# Patient Record
Sex: FEMALE | Race: BLACK OR AFRICAN AMERICAN | NOT HISPANIC OR LATINO | Employment: FULL TIME | URBAN - METROPOLITAN AREA
[De-identification: names, ages, dates, MRNs, and addresses within clinical notes are randomized per-mention and may not be internally consistent; named-entity substitution may affect disease eponyms.]

---

## 2017-02-06 ENCOUNTER — TELEPHONE (OUTPATIENT)
Dept: SPINE | Facility: CLINIC | Age: 22
End: 2017-02-06

## 2017-02-06 DIAGNOSIS — M54.50 LOW BACK PAIN WITHOUT SCIATICA, UNSPECIFIED BACK PAIN LATERALITY, UNSPECIFIED CHRONICITY: Primary | ICD-10-CM

## 2017-02-13 ENCOUNTER — HOSPITAL ENCOUNTER (OUTPATIENT)
Dept: RADIOLOGY | Facility: OTHER | Age: 22
Discharge: HOME | End: 2017-02-13
Attending: NEUROLOGICAL SURGERY
Payer: COMMERCIAL

## 2017-02-13 ENCOUNTER — OFFICE VISIT (OUTPATIENT)
Dept: SPINE | Facility: CLINIC | Age: 22
End: 2017-02-13
Attending: ORTHOPAEDIC SURGERY
Payer: COMMERCIAL

## 2017-02-13 VITALS — HEART RATE: 54 BPM | DIASTOLIC BLOOD PRESSURE: 68 MMHG | HEIGHT: 63 IN | SYSTOLIC BLOOD PRESSURE: 120 MMHG

## 2017-02-13 DIAGNOSIS — M54.16 LUMBAR RADICULOPATHY: Primary | ICD-10-CM

## 2017-02-13 DIAGNOSIS — M54.50 LOW BACK PAIN WITHOUT SCIATICA, UNSPECIFIED BACK PAIN LATERALITY, UNSPECIFIED CHRONICITY: ICD-10-CM

## 2017-02-13 PROCEDURE — 72100 X-RAY EXAM L-S SPINE 2/3 VWS: CPT | Mod: 26,,, | Performed by: RADIOLOGY

## 2017-02-13 PROCEDURE — 72120 X-RAY BEND ONLY L-S SPINE: CPT | Mod: TC

## 2017-02-13 PROCEDURE — 99999 PR PBB SHADOW E&M-EST. PATIENT-LVL III: CPT | Mod: PBBFAC,,, | Performed by: ORTHOPAEDIC SURGERY

## 2017-02-13 PROCEDURE — 72120 X-RAY BEND ONLY L-S SPINE: CPT | Mod: 26,,, | Performed by: RADIOLOGY

## 2017-02-13 PROCEDURE — 99214 OFFICE O/P EST MOD 30 MIN: CPT | Mod: S$GLB,,, | Performed by: ORTHOPAEDIC SURGERY

## 2017-02-13 RX ORDER — METHYLPREDNISOLONE 4 MG/1
TABLET ORAL
Qty: 1 PACKAGE | Refills: 0 | Status: SHIPPED | OUTPATIENT
Start: 2017-02-13 | End: 2017-03-27 | Stop reason: ALTCHOICE

## 2017-02-13 NOTE — LETTER
February 13, 2017      Hayley Mcarthur MD  [address]  [facility] [address]           [facility]  [address] [place] [address]  Phone: [phone]  Fax: [phone]          Patient: Rosa Marshall   MR Number: [id number]   YOB: 1995   Date of Visit: 2/13/2017       Dear Dr. Hayley Mcarthur:    Thank you for referring Rosa Marshall to me for evaluation. Attached you will find relevant portions of my assessment and plan of care.    If you have questions, please do not hesitate to call me. I look forward to following Rosa Marshall along with you.    Sincerely,    Faizan Silva MD    Enclosure  CC:  No Recipients    If you would like to receive this communication electronically, please contact [email or web address] or [phone] to request more information on [employer] Link access.    For providers and/or their staff who would like to refer a patient to [facility], please contact us through our one-stop-shop provider referral line, [facility], at [phone].    If you feel you have received this communication in error or would no longer like to receive these types of communications, please e-mail [email or web address]

## 2017-02-13 NOTE — PROGRESS NOTES
DATE: 2/13/2017  PATIENT: Rosa Marshall    Attending Physician: Faizan Silva M.D.    CHIEF COMPLAINT: back pain and left leg pain    HISTORY:  Rosa Marshall is a 21 y.o. female, Julio at [employer] - , here for initial evaluation of low back and left leg pain (Back - 8, Leg - 10). The pain has been present for 2 weeks after a fall directly onto her back/buttock after a jump- ~5'. The patient describes the pain as achy back pain radiating down back of left lower extremity to foot.  The pain is worse with running, jumping and improved by nothing. There is associated numbness and tingling. There is no subjective weakness. Prior treatments have included NSAIDs, PT, but no injections or surgery.    The Patient denies myelopathic symptoms such as handwriting changes or difficulty with buttons/coins/keys. Denies perineal paresthesias, bowel/bladder dysfunction.    She has history of left tibial nail in march 2015 for stress fracture.  Also history of right tibia osteoid osteoma diagnosis    PAST MEDICAL/SURGICAL HISTORY:  No past medical history on file.  Past Surgical History   Procedure Laterality Date    Tibia fracture surgery       bilateral    Tibia fracture surgery Left 3/31/2015      Dr. Mcarthur    Tumor removal       In shin bone       Current Medications:   Current Outpatient Prescriptions:     meloxicam (MOBIC) 15 MG tablet, Take 1 tablet (15 mg total) by mouth once daily. Take prilosec daily with medication, Disp: 30 tablet, Rfl: 1    Social History:   Social History     Social History    Marital status: Single     Spouse name: N/A    Number of children: N/A    Years of education: N/A     Occupational History    Not on file.     Social History Main Topics    Smoking status: Never Smoker    Smokeless tobacco: Not on file    Alcohol use No    Drug use: No    Sexual activity: Not on file     Other Topics Concern    Not on file     Social History Narrative       REVIEW OF  "SYSTEMS:  Constitution: Negative. Negative for chills, fever and night sweats.   Cardiovascular: Negative for chest pain and syncope.   Respiratory: Negative for cough and shortness of breath.   Gastrointestinal: See HPI. Negative for nausea/vomiting. Negative for abdominal pain.  Genitourinary: See HPI. Negative for discoloration or dysuria.  Hematologic/Lymphatic: neg for bleeding/clotting disorders.   Musculoskeletal: Negative for falls and muscle weakness.   Neurological: See HPI. neg history of seizures. neg history of cranial surgery or shunts.  Neurological: See HPI. No seizures.   Endocrine: Negative for polydipsia, polyphagia and polyuria.   Allergic/Immunologic: Negative for hives and persistent infections.     EXAM:  Visit Vitals    /68    Pulse (!) 54    Ht 5' 3" (1.6 m)       PHYSICAL EXAMINATION:    General: The patient is a pleasant 21 y.o. female in no apparent distress, the patient is orientatied to person, place and time.  Psych: Normal mood and affect  HEENT: Vision grossly intact, hearing intact to the spoken word.  Lungs: Respirations unlabored.  Gait: Normal station and gait, no difficulty with toe or heel walk.   Skin: Dorsal lumbar skin negative for rashes, lesions, hairy patches and surgical scars. There is + left-sided lumbar tenderness to palpation.  Range of motion: Lumbar range of motion is acceptable.  Spinal Balance: Global saggital and coronal spinal balance acceptable, no significant for scoliosis and kyphosis.  Musculoskeletal: No pain with the range of motion of the bilateral hips. No trochanteric tenderness to palpation.  Vascular: Bilateral lower extremities warm and well perfused, Dorsalis pedis pulses 2+ bilaterally.  Neurological: Normal strength and tone in all major motor groups in the bilateral lower extremities. Normal sensation to light touch in the L2-S1 dermatomes bilaterally.  Deep tendon reflexes symmetric 2+ in the bilateral lower extremities.  Negative " Babinski bilaterally. Straight leg raise negative bilaterally.    IMAGING:      Today I personally reviewed AP, Lat and Flex/Ex  upright L-spine that demonstrate no spondylosis, no spondylolysis or spondylolisthesis      ASSESSMENT/PLAN:    Likely lumbar disc herniation.  MRI to assess    Diagnoses and all orders for this visit:    Lumbar radiculopathy  -     MRI Lumbar Spine Without Contrast; Future    Other orders  -     methylPREDNISolone (MEDROL DOSEPACK) 4 mg tablet; use as directed    No Follow-up on file.

## 2017-02-17 ENCOUNTER — HOSPITAL ENCOUNTER (OUTPATIENT)
Dept: RADIOLOGY | Facility: OTHER | Age: 22
Discharge: HOME | End: 2017-02-17
Attending: ORTHOPAEDIC SURGERY
Payer: COMMERCIAL

## 2017-02-17 DIAGNOSIS — M54.16 LUMBAR RADICULOPATHY: ICD-10-CM

## 2017-02-17 PROCEDURE — 72148 MRI LUMBAR SPINE W/O DYE: CPT | Mod: TC

## 2017-02-17 PROCEDURE — 72148 MRI LUMBAR SPINE W/O DYE: CPT | Mod: 26,,, | Performed by: RADIOLOGY

## 2017-02-21 ENCOUNTER — OFFICE VISIT (OUTPATIENT)
Dept: ORTHOPEDICS | Facility: CLINIC | Age: 22
End: 2017-02-21
Payer: COMMERCIAL

## 2017-02-21 VITALS
HEIGHT: 63 IN | SYSTOLIC BLOOD PRESSURE: 112 MMHG | DIASTOLIC BLOOD PRESSURE: 77 MMHG | HEART RATE: 58 BPM | WEIGHT: 148.56 LBS | BODY MASS INDEX: 26.32 KG/M2

## 2017-02-21 DIAGNOSIS — M54.16 LUMBAR RADICULOPATHY: Primary | ICD-10-CM

## 2017-02-21 PROCEDURE — 99999 PR PBB SHADOW E&M-EST. PATIENT-LVL III: CPT | Mod: PBBFAC,,, | Performed by: ORTHOPAEDIC SURGERY

## 2017-02-21 PROCEDURE — 99212 OFFICE O/P EST SF 10 MIN: CPT | Mod: S$GLB,,, | Performed by: ORTHOPAEDIC SURGERY

## 2017-02-21 RX ORDER — DICLOFENAC SODIUM 50 MG/1
50 TABLET, DELAYED RELEASE ORAL 2 TIMES DAILY
Qty: 60 TABLET | Refills: 1 | Status: SHIPPED | OUTPATIENT
Start: 2017-02-21 | End: 2017-07-28

## 2017-03-27 ENCOUNTER — OFFICE VISIT (OUTPATIENT)
Dept: SPINE | Facility: CLINIC | Age: 22
End: 2017-03-27
Attending: ORTHOPAEDIC SURGERY
Payer: COMMERCIAL

## 2017-03-27 VITALS — HEIGHT: 63 IN | HEART RATE: 50 BPM | SYSTOLIC BLOOD PRESSURE: 117 MMHG | DIASTOLIC BLOOD PRESSURE: 67 MMHG

## 2017-03-27 DIAGNOSIS — M54.16 LUMBAR RADICULOPATHY: Primary | ICD-10-CM

## 2017-03-27 PROCEDURE — 99999 PR PBB SHADOW E&M-EST. PATIENT-LVL II: CPT | Mod: PBBFAC,,, | Performed by: ORTHOPAEDIC SURGERY

## 2017-03-27 PROCEDURE — 99212 OFFICE O/P EST SF 10 MIN: CPT | Mod: S$GLB,,, | Performed by: ORTHOPAEDIC SURGERY

## 2017-03-27 PROCEDURE — 1160F RVW MEDS BY RX/DR IN RCRD: CPT | Mod: S$GLB,,, | Performed by: ORTHOPAEDIC SURGERY

## 2017-03-27 NOTE — PROGRESS NOTES
The patient returns for follow up.    She is a AMMY  with LLE radiculopathy.    The season is now over but she still is having a lot of pain.    She has tried NSAIDs and PT but no injections.    She has equal good and bad days.    Today we discussed options, I have recommended she try a Left L4/5 TFESI.    I think surgery is overall unlikely for her given that her disc herniation is fairly small.    RTC 1 week after injection.    I spent 10 minutes with the patient of which greater than 1/2 the time was devoted to counciling the patient regarding treatment options.

## 2017-03-29 ENCOUNTER — TELEPHONE (OUTPATIENT)
Dept: PAIN MEDICINE | Facility: CLINIC | Age: 22
End: 2017-03-29

## 2017-03-29 NOTE — TELEPHONE ENCOUNTER
----- Message from Elsie Acuña sent at 3/29/2017  1:03 PM CDT -----  Contact: Lorne  _  1st Request  _  2nd Request  _  3rd Request        Who: James, [facility]    Why: Lorne is requesting an earlier time for pt procedure on 4/17/17   What Number to Call Back: Lorne can be reached at [phone]    When to Expect a call back: (Before the end of the day)   -- if the call is after 12:00, the call back will be tomorrow.

## 2017-04-17 ENCOUNTER — HOSPITAL ENCOUNTER (OUTPATIENT)
Facility: OTHER | Age: 22
Discharge: HOME | End: 2017-04-17
Attending: ANESTHESIOLOGY | Admitting: ANESTHESIOLOGY
Payer: COMMERCIAL

## 2017-04-17 ENCOUNTER — SURGERY (OUTPATIENT)
Age: 22
End: 2017-04-17

## 2017-04-17 VITALS
RESPIRATION RATE: 18 BRPM | WEIGHT: 150 LBS | SYSTOLIC BLOOD PRESSURE: 129 MMHG | BODY MASS INDEX: 26.58 KG/M2 | HEART RATE: 54 BPM | TEMPERATURE: 99 F | DIASTOLIC BLOOD PRESSURE: 73 MMHG | OXYGEN SATURATION: 98 % | HEIGHT: 63 IN

## 2017-04-17 DIAGNOSIS — R52 PAIN: Primary | ICD-10-CM

## 2017-04-17 LAB
B-HCG UR QL: NEGATIVE
CTP QC/QA: YES

## 2017-04-17 PROCEDURE — 81025 URINE PREGNANCY TEST: CPT | Performed by: ANESTHESIOLOGY

## 2017-04-17 PROCEDURE — 64483 NJX AA&/STRD TFRM EPI L/S 1: CPT | Mod: LT,,, | Performed by: ANESTHESIOLOGY

## 2017-04-17 PROCEDURE — 25000003 PHARM REV CODE 250: Performed by: ANESTHESIOLOGY

## 2017-04-17 PROCEDURE — 25500020 PHARM REV CODE 255: Performed by: ANESTHESIOLOGY

## 2017-04-17 PROCEDURE — 63600175 PHARM REV CODE 636 W HCPCS: Performed by: ANESTHESIOLOGY

## 2017-04-17 PROCEDURE — 64483 NJX AA&/STRD TFRM EPI L/S 1: CPT | Performed by: ANESTHESIOLOGY

## 2017-04-17 RX ORDER — DEXAMETHASONE SODIUM PHOSPHATE 100 MG/10ML
INJECTION INTRAMUSCULAR; INTRAVENOUS
Status: DISCONTINUED | OUTPATIENT
Start: 2017-04-17 | End: 2017-04-17 | Stop reason: HOSPADM

## 2017-04-17 RX ORDER — ALPRAZOLAM 0.5 MG/1
1 TABLET, ORALLY DISINTEGRATING ORAL
Status: DISCONTINUED | OUTPATIENT
Start: 2017-04-17 | End: 2017-04-17 | Stop reason: HOSPADM

## 2017-04-17 RX ORDER — LIDOCAINE HYDROCHLORIDE 10 MG/ML
INJECTION INFILTRATION; PERINEURAL
Status: DISCONTINUED | OUTPATIENT
Start: 2017-04-17 | End: 2017-04-17 | Stop reason: HOSPADM

## 2017-04-17 RX ORDER — LIDOCAINE HYDROCHLORIDE 10 MG/ML
INJECTION, SOLUTION EPIDURAL; INFILTRATION; INTRACAUDAL; PERINEURAL
Status: DISCONTINUED | OUTPATIENT
Start: 2017-04-17 | End: 2017-04-17 | Stop reason: HOSPADM

## 2017-04-17 RX ADMIN — DEXAMETHASONE SODIUM PHOSPHATE 10 MG: 10 INJECTION INTRAMUSCULAR; INTRAVENOUS at 03:04

## 2017-04-17 RX ADMIN — LIDOCAINE HYDROCHLORIDE 10 ML: 10 INJECTION, SOLUTION EPIDURAL; INFILTRATION; INTRACAUDAL; PERINEURAL at 03:04

## 2017-04-17 RX ADMIN — IOHEXOL 2 ML: 300 INJECTION, SOLUTION INTRAVENOUS at 03:04

## 2017-04-17 RX ADMIN — ALPRAZOLAM 1 MG: 0.5 TABLET, ORALLY DISINTEGRATING ORAL at 03:04

## 2017-04-17 RX ADMIN — LIDOCAINE HYDROCHLORIDE 10 ML: 10 INJECTION, SOLUTION INFILTRATION; PERINEURAL at 03:04

## 2017-04-17 NOTE — DISCHARGE INSTRUCTIONS

## 2017-04-17 NOTE — IP AVS SNAPSHOT
[facility] Location ([facility])  [address] [place], [place] [address]  Phone: [phone]           Patient Discharge Instructions   Our goal is to set you up for success. This packet includes information on your condition, medications, and your home care.  It will help you care for yourself to prevent having to return to the hospital.     Please ask your nurse if you have any questions.      There are many details to remember when preparing to leave the hospital. Here is what you will need to do:    1. Take your medicine. If you are prescribed medications, review your Medication List on the following pages. You may have new medications to  at the pharmacy and others that you'll need to stop taking. Review the instructions for how and when to take your medications. Talk with your doctor or nurses if you are unsure of what to do.     2. Go to your follow-up appointments. Specific follow-up information is listed in the following pages. Your may be contacted by a nurse or clinical provider about future appointments. Be sure we have all of the phone numbers to reach you. Please contact your provider's office if you are unable to make an appointment.     3. Watch for warning signs. Your doctor or nurse will give you detailed warning signs to watch for and when to call for assistance. These instructions may also include educational information about your condition. If you experience any of warning signs to your health, call your doctor.           [facility]  Unless otherwise directed by your provider, please   contact [facility], our nurse care line   that is available for 24/7 assistance.     [phone] (toll-free)     Registered nurses in the [facility]   provide: appointment scheduling, clinical advisement, health education, and other advisory services.                  ** Verify the list of medication(s) below is accurate and up to date. Carry this with you in case of  emergency. If your medications have changed, please notify your healthcare provider.             Medication List      CONTINUE taking these medications        Additional Info                      diclofenac 50 MG EC tablet   Commonly known as:  VOLTAREN   Quantity:  60 tablet   Refills:  1   Dose:  50 mg    Instructions:  Take 1 tablet (50 mg total) by mouth 2 (two) times daily.     Begin Date    AM    Noon    PM    Bedtime                  Please bring to all follow up appointments:    1. A copy of your discharge instructions.  2. All medicines you are currently taking in their original bottles.  3. Identification and insurance card.    Please arrive 15 minutes ahead of scheduled appointment time.    Please call 24 hours in advance if you must reschedule your appointment and/or time.            Discharge Instructions       Home Care Instructions Pain Management:    1. DIET:   You may resume your normal diet today.   2. BATHING:   You may shower with luke warm water. No soaking in tub.  3. DRESSING:   You may remove your bandage today.   4. ACTIVITY LEVEL:   You may resume your normal activities 24 hrs after your procedure.  5. MEDICATIONS:   You may resume your normal medications today.   6. SPECIAL INSTRUCTIONS:   No heat to the injection site for 24 hrs including, bath or shower, heating pad, moist heat, or hot tubs.    Use ice pack to injection site for any pain or discomfort.  Apply ice packs for 20 minute intervals as needed.   If you have received any sedatives by mouth today you may not drive for 12 hours.    If you have received any sedation through your IV, you may not drive for 24 hrs.     PLEASE CALL YOUR DOCTOR IF:  1. Redness or swelling around the injection site.  2. Fever of 101 degrees  3. Drainage (pus) from the injection site.  4. For any continuous bleeding (some dried blood over the incision is normal.)  5. For severe headache that is relieved when lying flat.    FOR EMERGENCIES:   If any  "unusual problems or difficulties occur during clinic hours, call [phone] or 361.         Admission Information     Date & Time Provider Department CSN    4/17/2017  3:00 PM Jeniffer Regan MD [facility] [id number]      Care Providers     Provider Role Specialty Primary office phone    Jeniffer Regan MD Attending Provider Pain Medicine [phone]    Jeniffer Regan MD Surgeon  Pain Medicine [phone]      Your Vitals Were     BP Pulse Temp Resp Height Weight    130/76 55 98.5 °F (36.9 °C) (Oral) 18 5' 3" (1.6 m) 68 kg (150 lb)    SpO2 BMI             100% 26.57 kg/m2         Recent Lab Values     No lab values to display.      Allergies as of 4/17/2017     No Known Allergies      Advance Directives     An advance directive is a document which, in the event you are no longer able to make decisions for yourself, tells your healthcare team what kind of treatment you do or do not want to receive, or who you would like to make those decisions for you.  If you do not currently have an advance directive, [facility] encourages you to create one.  For more information call:  [phone]), [phone] ([phone]),  or log on to [email or web address].        Language Assistance Services     ATTENTION: Language assistance services are available, free of charge. Please call [phone].      ATENCIÓN: Si habla español, tiene a wang disposición servicios gratuitos de asistencia lingüística. Llame al [phone].     [facility] Ý: N?u b?n nói Ti?ng Vi?t, có các d?ch v? h? tr? ngôn ng? mi?n phí dành cho b?n. G?i s? [phone].         [facility] complies with applicable Federal civil rights laws and does not discriminate on the basis of race, color, national origin, age, disability, or sex.        "

## 2017-04-17 NOTE — OP NOTE
Lumbar Transforaminal Epidural Steroid Injection  Date of Service: 04/17/2017    PCP: Tristan Cain MD (Inactive)    Referring Physician: Dr. Silva    Time-out taken to identify patient and procedure side prior to starting      the procedure.                                                                 PROCEDURE: Left L4 transforaminal epidural steroid injection under fluoroscopy    REASON FOR PROCEDURE: Left radiculopathy    PHYSICIAN: Jeniffer Regan MD  ASSISTANTS:Gary Pringle DO    MEDICATIONS INJECTED:  Preservative-free dexamethasone 10mg, Xylocaine 1% MPF 3-5ml. 3ml per level. Preservative free, sterile normal saline is used to get larger volume as needed.  LOCAL ANESTHETIC INJECTED:  Xylocaine 1% 9ml with Sodium Bicarbonate 1ml. 3ml per site.    SEDATION MEDICATIONS: None  ESTIMATED BLOOD LOSS:  None.    COMPLICATIONS:  None.    TECHNIQUE:   Laying in a prone position, the patient was prepped and draped in the usual sterile fashion using ChloraPrep and fenestrated drape.  The area to be injected was determined under fluoroscopic guidance.  Local anesthetic was given by raising a wheel and going down to the hub of a 27-gauge 1.25 inch needle.  The 3.5inch 22-gauge spinal needle was introduced towards the transverse process of each above named nerve root level.  The needle was walked medially then hinged into the neural foramen.  Omnipaque was injected to confirm appropriate placement and that there was no vascular runoff.  The medication was then injected after applying negative pressure. The patient tolerated the procedure well.    PAIN BEFORE THE PROCEDURE: 8/10.    PAIN AFTER THE PROCEDURE: 2/10.    The patient was monitored after the procedure.  Patient was given post procedure and discharge instructions to follow at home.  We will see the patient back in two weeks or the patient may call to inform of status. The patient was discharged in a stable condition.

## 2017-07-28 ENCOUNTER — OFFICE VISIT (OUTPATIENT)
Dept: ORTHOPEDICS | Facility: CLINIC | Age: 22
End: 2017-07-28
Payer: COMMERCIAL

## 2017-07-28 VITALS — WEIGHT: 152.88 LBS | HEIGHT: 63 IN | BODY MASS INDEX: 27.09 KG/M2

## 2017-07-28 DIAGNOSIS — M54.50 LEFT-SIDED LOW BACK PAIN WITHOUT SCIATICA, UNSPECIFIED CHRONICITY: Primary | ICD-10-CM

## 2017-07-28 DIAGNOSIS — M25.561 RIGHT KNEE PAIN, UNSPECIFIED CHRONICITY: ICD-10-CM

## 2017-07-28 PROCEDURE — 99999 PR PBB SHADOW E&M-EST. PATIENT-LVL II: CPT | Mod: PBBFAC,,, | Performed by: PHYSICIAN ASSISTANT

## 2017-07-28 PROCEDURE — 99213 OFFICE O/P EST LOW 20 MIN: CPT | Mod: S$GLB,,, | Performed by: PHYSICIAN ASSISTANT

## 2017-07-28 RX ORDER — NAPROXEN 500 MG/1
500 TABLET ORAL 2 TIMES DAILY WITH MEALS
Qty: 60 TABLET | Refills: 0 | Status: SHIPPED | OUTPATIENT
Start: 2017-07-28 | End: 2017-10-10 | Stop reason: SDUPTHER

## 2017-07-28 NOTE — PROGRESS NOTES
Rosa Marshall returns for follow up.  She was last seen by Dr. Silva 3/27/2017.  She is a AMMY  with left sided low back pain and left lower extremity radiculopathy.  She says she was doing better until about 3 weeks ago during practice she had increased back and left leg pain all of a sudden.  The pain has subsided and is back to her baseline pain.  She also notes some increased right knee pain.  Her , Lorne, says he measured her legs for a leg length discrepancy and put a shoe lift in her shoe about a week ago.  She says since then, her back pain has improved slightly but her knee pain has worsened.      She is not taking anything for pain.      She had an CHASE 4/17/2017 with little relief.  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     She is not taking anything regularly for pain but says that sometimes she takes aleve with good relief.    Physical exam stable.  Neuro exam stable.       Imaging today was reviewed and shows a small disc bulge at L4/5.    A/P:  Today we discussed options.  I will send a message to Dr. Mcarthur regarding her knee and possible leg length discrepancy.  Prescription for naproxen given today.  Continue PT with athletic trainers.  They are about to start a 2 week break from all training.      We will keep them out of boot camp for now.  Follow up in 4 weeks to re-evaluate.

## 2017-08-01 DIAGNOSIS — M21.70 LEG LENGTH DISCREPANCY: Primary | ICD-10-CM

## 2017-08-02 ENCOUNTER — HOSPITAL ENCOUNTER (OUTPATIENT)
Dept: RADIOLOGY | Facility: HOSPITAL | Age: 22
Discharge: HOME | End: 2017-08-02
Attending: ORTHOPAEDIC SURGERY
Payer: COMMERCIAL

## 2017-08-02 DIAGNOSIS — M21.70 LEG LENGTH DISCREPANCY: ICD-10-CM

## 2017-08-02 PROCEDURE — 77073 BONE LENGTH STUDIES: CPT | Mod: TC,PO

## 2017-08-02 PROCEDURE — 77073 BONE LENGTH STUDIES: CPT | Mod: 26,,, | Performed by: RADIOLOGY

## 2017-08-07 ENCOUNTER — TELEPHONE (OUTPATIENT)
Dept: SPORTS MEDICINE | Facility: CLINIC | Age: 22
End: 2017-08-07

## 2017-08-07 NOTE — TELEPHONE ENCOUNTER
Left message with mobile phone number listed for return call.  Scanogram results show no significant leg length discrepancy.  She can continue with NSAIDs and physical therapy.  If symptoms continue or worsen she can make an appt to be seen.

## 2017-10-02 ENCOUNTER — OFFICE VISIT (OUTPATIENT)
Dept: ORTHOPEDICS | Facility: CLINIC | Age: 22
End: 2017-10-02
Payer: COMMERCIAL

## 2017-10-02 VITALS — WEIGHT: 158.06 LBS | BODY MASS INDEX: 28 KG/M2 | HEIGHT: 63 IN

## 2017-10-02 DIAGNOSIS — M54.16 LUMBAR RADICULOPATHY: Primary | ICD-10-CM

## 2017-10-02 PROCEDURE — 99213 OFFICE O/P EST LOW 20 MIN: CPT | Mod: S$GLB,,, | Performed by: ORTHOPAEDIC SURGERY

## 2017-10-02 PROCEDURE — 99999 PR PBB SHADOW E&M-EST. PATIENT-LVL III: CPT | Mod: PBBFAC,,, | Performed by: ORTHOPAEDIC SURGERY

## 2017-10-02 RX ORDER — METHYLPREDNISOLONE 4 MG/1
TABLET ORAL
Qty: 1 PACKAGE | Refills: 0 | Status: SHIPPED | OUTPATIENT
Start: 2017-10-02 | End: 2022-09-20 | Stop reason: ALTCHOICE

## 2017-10-05 ENCOUNTER — HOSPITAL ENCOUNTER (OUTPATIENT)
Dept: RADIOLOGY | Facility: OTHER | Age: 22
Discharge: HOME | End: 2017-10-05
Attending: ORTHOPAEDIC SURGERY
Payer: COMMERCIAL

## 2017-10-05 DIAGNOSIS — M54.16 LUMBAR RADICULOPATHY: ICD-10-CM

## 2017-10-05 PROCEDURE — 72148 MRI LUMBAR SPINE W/O DYE: CPT | Mod: 26,,, | Performed by: RADIOLOGY

## 2017-10-05 PROCEDURE — 72148 MRI LUMBAR SPINE W/O DYE: CPT | Mod: TC

## 2017-10-10 ENCOUNTER — OFFICE VISIT (OUTPATIENT)
Dept: ORTHOPEDICS | Facility: CLINIC | Age: 22
End: 2017-10-10
Payer: COMMERCIAL

## 2017-10-10 VITALS — BODY MASS INDEX: 28 KG/M2 | WEIGHT: 158.06 LBS | HEIGHT: 63 IN

## 2017-10-10 DIAGNOSIS — M54.16 LUMBAR RADICULOPATHY: Primary | ICD-10-CM

## 2017-10-10 PROCEDURE — 99999 PR PBB SHADOW E&M-EST. PATIENT-LVL II: CPT | Mod: PBBFAC,,, | Performed by: PHYSICIAN ASSISTANT

## 2017-10-10 PROCEDURE — 99213 OFFICE O/P EST LOW 20 MIN: CPT | Mod: S$GLB,,, | Performed by: PHYSICIAN ASSISTANT

## 2017-10-10 RX ORDER — NAPROXEN 500 MG/1
500 TABLET ORAL 2 TIMES DAILY WITH MEALS
Qty: 60 TABLET | Refills: 0 | Status: SHIPPED | OUTPATIENT
Start: 2017-10-10 | End: 2022-09-20 | Stop reason: ALTCHOICE

## 2017-10-10 NOTE — PROGRESS NOTES
DATE: 10/10/2017    Attending Physician: Faizan Silva M.D.    History:  The patient returns for MRI results.  She was seen by Dr. Silva 10/2/2017.  She started having worsening left posterior leg pain about a week ago.  She had an MRI 10/5/2017.  The pain does not keep her from playing.     Physical exam stable.  Neuro exam stable.      MRI lumbar spine demonstrates a mild disc bulge at L4/5 with mild neural foraminal narrowing.     A/P:  Discussed with Dr. Silva.  There is no surgical intervention indicated.  The patient does not want to repeat injections.  She will take naproxen and continue practicing.  Follow up with any new or worsening symptoms.

## 2017-10-11 NOTE — PROGRESS NOTES
The patient presents for follow up.    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    She has a history of LLE radiculopathy and has had steroid injections in the past, but did not have much benefit with these.    Today she reports this pain is the worst that she has had.    She just came back to basketball one week ago.    She has tried mobic and PT.    On exam she has mild paraspinal TTP  She has an equivical L SLR  There are no focal motor or sensory deficits.    A) LLE radiculopathy  P) MRI L spine.    I spent 10 minutes with the patient of which greater than 1/2 the time was devoted to counciling the patient regarding treatment options.

## 2018-03-21 ENCOUNTER — HOSPITAL ENCOUNTER (OUTPATIENT)
Dept: RADIOLOGY | Facility: HOSPITAL | Age: 23
Discharge: HOME | End: 2018-03-21
Attending: ORTHOPAEDIC SURGERY
Payer: COMMERCIAL

## 2018-03-21 ENCOUNTER — OFFICE VISIT (OUTPATIENT)
Dept: SPORTS MEDICINE | Facility: CLINIC | Age: 23
End: 2018-03-21
Payer: COMMERCIAL

## 2018-03-21 VITALS
HEIGHT: 65 IN | WEIGHT: 150 LBS | SYSTOLIC BLOOD PRESSURE: 122 MMHG | BODY MASS INDEX: 24.99 KG/M2 | DIASTOLIC BLOOD PRESSURE: 80 MMHG | HEART RATE: 64 BPM

## 2018-03-21 DIAGNOSIS — M79.604 PAIN OF RIGHT LOWER EXTREMITY: ICD-10-CM

## 2018-03-21 DIAGNOSIS — S76.311A RIGHT HAMSTRING MUSCLE STRAIN, INITIAL ENCOUNTER: ICD-10-CM

## 2018-03-21 DIAGNOSIS — M79.671 RIGHT FOOT PAIN: Primary | ICD-10-CM

## 2018-03-21 DIAGNOSIS — M79.671 RIGHT FOOT PAIN: ICD-10-CM

## 2018-03-21 PROCEDURE — 73630 X-RAY EXAM OF FOOT: CPT | Mod: TC,FY,PO,RT

## 2018-03-21 PROCEDURE — 73552 X-RAY EXAM OF FEMUR 2/>: CPT | Mod: TC,FY,PO,RT

## 2018-03-21 PROCEDURE — 73552 X-RAY EXAM OF FEMUR 2/>: CPT | Mod: 26,RT,, | Performed by: RADIOLOGY

## 2018-03-21 PROCEDURE — 73630 X-RAY EXAM OF FOOT: CPT | Mod: 26,RT,, | Performed by: RADIOLOGY

## 2018-03-21 PROCEDURE — 99999 PR PBB SHADOW E&M-EST. PATIENT-LVL III: CPT | Mod: PBBFAC,,, | Performed by: ORTHOPAEDIC SURGERY

## 2018-03-21 PROCEDURE — 99214 OFFICE O/P EST MOD 30 MIN: CPT | Mod: S$GLB,,, | Performed by: ORTHOPAEDIC SURGERY

## 2018-03-21 NOTE — PROGRESS NOTES
CC: right hamstring pain, [employer] basketball senior    HPI:   Rosa Marshall is a pleasant 22 y.o. patient who reports to clinic with right hamstring pain. No trauma, no mech sxs/instabilty.    Patient notes medial midbody hamstring pain   Pain has been present for the past month   She notes that the pain began during a basketball game while playing defense, she doesn't remember the exact mechanism    Today the patient rates pain at a 7/10 on visual analog scale.      Affecting ADLs and exercising  She has done treatment with her  Lorne   She notes that she feels as though she is slowly improving   She also notes spasm in her muscle     Squatting activity and pushing off of her right side activity worst    50% better compared to injury    Review of Systems   Constitution: Negative. Negative for chills, fever and night sweats.   HENT: Negative for congestion and headaches.    Eyes: Negative for blurred vision, left vision loss and right vision loss.   Cardiovascular: Negative for chest pain and syncope.   Respiratory: Negative for cough and shortness of breath.    Endocrine: Negative for polydipsia, polyphagia and polyuria.   Hematologic/Lymphatic: Negative for bleeding problem. Does not bruise/bleed easily.   Skin: Negative for dry skin, itching and rash.   Musculoskeletal: Negative for falls and muscle weakness.   Gastrointestinal: Negative for abdominal pain and bowel incontinence.   Genitourinary: Negative for bladder incontinence and nocturia.   Neurological: Negative for disturbances in coordination, loss of balance and seizures.   Psychiatric/Behavioral: Negative for depression. The patient does not have insomnia.    Allergic/Immunologic: Negative for hives and persistent infections.     PAST MEDICAL HISTORY: History reviewed. No pertinent past medical history.  PAST SURGICAL HISTORY:   Past Surgical History:   Procedure Laterality Date    TIBIA FRACTURE SURGERY      bilateral    TIBIA  "FRACTURE SURGERY Left 3/31/2015     Dr. Mcarthur    TUMOR REMOVAL      In shin bone     FAMILY HISTORY:   Family History   Problem Relation Age of Onset    No Known Problems Mother     No Known Problems Father      SOCIAL HISTORY:   Social History     Social History    Marital status: Single     Spouse name: N/A    Number of children: N/A    Years of education: N/A     Occupational History    Not on file.     Social History Main Topics    Smoking status: Never Smoker    Smokeless tobacco: Never Used    Alcohol use No    Drug use: No    Sexual activity: Not on file     Other Topics Concern    Not on file     Social History Narrative    No narrative on file       MEDICATIONS:   Current Outpatient Prescriptions:     methylPREDNISolone (MEDROL DOSEPACK) 4 mg tablet, use as directed, Disp: 1 Package, Rfl: 0    naproxen (NAPROSYN) 500 MG tablet, Take 1 tablet (500 mg total) by mouth 2 (two) times daily with meals., Disp: 60 tablet, Rfl: 0  ALLERGIES: Review of patient's allergies indicates:  No Known Allergies    VITAL SIGNS: /80   Pulse 64   Ht 5' 5" (1.651 m)   Wt 68 kg (150 lb)   LMP 03/19/2018   BMI 24.96 kg/m²        PHYSICAL EXAM /  HIP  PHYSICAL EXAMINATION  General:  The patient is alert and oriented x 3.  Mood is pleasant.  Observation of ears, eyes and nose reveal no gross abnormalities.  HEENT: NCAT, sclera nonicteric  Lungs: Respirations are equal and unlabored..    right HIP EXAMINATION     OBSERVATION / INSPECTION  Gait:   Nonantalgic   Alignment:  Neutral   Scars:   None   Muscle atrophy: None   Effusion:  None   Warmth:  None   Discoloration:   None   Leg lengths:   Equal   Pelvis:   Level     TENDERNESS / CREPITUS (T/C):      T / C  Trochanteric bursa   - / -  Piriformis    - / -  SI joint    - / -  Psoas tendon   - / -  Rectus insertion  - / -  Adductor insertion  - / -  Pubic symphysis  - / -  Hamstring   + semitendinosus/ semimembranosus mid body/distal    ROM: (* = pain)  "   Knee flexion: normal    SPECIAL TESTS:  Pain w/ forced internal rotation (FADIR): -   Pain w/ forced external rotation (CHARLIE): Absent   Circumduction test:    -  Stinchfield test:    Negative   Log roll:      Negative   Snapping hip (internal):   Negative   Sit-up pain:     Negative   Resisted sit-up pain:    Negative   Resisted sit-up with adductor contraction pain:  Negative   Step-down test:    +  Trendelenburg test:    Negative  Bridge test     +         EXTREMITY NEURO-VASCULAR EXAMINATION:   Sensation:  Grossly intact to light touch all dermatomal regions.   Motor Function:  Fully intact motor function at hip, knee, foot and ankle    DTRs;  quadriceps and  achilles 2+.  No clonus and downgoing Babinski.    Vascular status:  DP and PT pulses 2+, brisk capillary refill, symmetric.    Skin:  intact, compartments soft.    OTHER FINDINGS:      XRAYS:  Normal, no fracture or dislocation     ASSESSMENT:    1. right hamstring strain    PLAN:  1. MRI to evaluate hamstring  2. Following the MRI, based on results proceed with dry needling  3. All questions were answered, pt will contact us for questions or concerns in the interim.      CHIEF COMPLAINT: Right Foot pain.                                                          HISTORY OF PRESENT ILLNESS:  The patient is a 22 y.o. female  who presents  for evaluation of her right foot pain.     History of Trauma: She notes injuring her foot at the same time as her hamstring, she does not recall the exact mechanism of injury  Pain Duration: 1 months  Pain Quality: achy with episodes of sharp pain when she steps wrong  Pain Context:unchanged  Pain Timing: constant  Pain Location:anterior  Modifying Factors: depending on the day she notes pain with walking, pain with basketball   Previous Treatments: Treatment with  millie Pradhan  Associated Signs and Symptoms:She notes swelling immediately after injury but this has improved       PHYSICAL  EXAMINATION    General:  The patient is alert and oriented x 3.  Mood is pleasant.  Observation of ears, eyes and nose reveal no gross abnormalities.  No labored breathing observed.    right Foot and Ankle Exam    INSPECTION:      ALIGNMENT:  Gait:    Normal   Hindfoot  Normal    Scars:   None    Midfoot: Normal  Swelling:  None    Forefoot: Normal  Color:   Normal      Atrophy:  None    Collective Ankle-Hindfoot Alignment    Heel / Toe Walking: No difficulty   Good -plantigrade (PG), well aligned           [Fair-PG, malaligned, asymptomatic]         [Poor-Non-PG,malaligned, has sxs]     TENDERNESS:  lATERAL:    anterior:  Sinus tarsi:  None  Anteromedial joint line:  none  Syndesmosis:  none  Anterolateral joint line:   none  ATFL:   none  Talonavicular:    none   CFL:   none  Anterior tibialis:   none  Anterolateral gutter: none  Extensor tendons:   none  Fibula:   none  Peroneal tendons: none  POSTERIOR:  Peroneal tubercle.  None  Medial/lateral achilles:   none       Medial/lateral achilles insertion: none  MEDIAL:      Deltoid:  none  CALCANEUS:  Malleolus:  none  Retrocalcaneal:   none  PTT:   none  Medial achilles:   none  Navicular:  none  Lateral achilles:   none       Calcaneal tuberosity:   none  FOOT:    Calcaneal cuboid  none MT / MT heads:  none   Navicular   none  Medial cord origin PF:  none  Cuneiforms:   +   Web space:   none  Lisfranc    none  Tarsal tunnel:   none  Base of the fifth metatarsal  none Tinels sign   neg  Extensor Hallucis longus +      RANGE OF MOTION:  RIGHT/ LEFT   STRENGTH: (affected)  Ankle DF/PF:  15/45  15/45    Anterior tibialis: 5/5     Eversion/Inversion: 15/25 15/25  Posterior tibialis: 5/5   Midfoot ABD/ADD: 10/10 10/10  Gastroc-soleus: 5/5   First MTP DF/PF: 60/25 60/25  Peroneals:  5/5         EHL:   5/5   (* = pain)     FHL:   5/5         (* = pain)      SPECIAL TESTS:   ANKLE INSTABILITY: (*pain)    Anterior drawer:   Normal      (C-W contralateral side)      Inversion:   30°     Eversion  10°            Collective Instability: (Ant-post and varus-valgus)     Stable        PROVOCATIVE TESTING:    Forced DF/ER: No pain at syndesmosis.    Mid-leg squeeze  No pain at syndesmosis    Forced DF:  No pain anterior joint line.      Forced PF:  No pain posterior ankle.     Forced INV:  No pain lateral    Forced EV:  No pain medial     Jays sign: Normal ankle plantar flexion.     Resisted peroneal No subluxation or pain    1st-2nd MT toggle No pain at Lisfranc    MT-T torque  No pain at Lisfranc     NEUROLOGIC TESTING:  All dermatomes foot, ankle and leg have normal sensation light touch  Ankle Reflexes 2+, symmetric   Negative Babinski and No Clonus    VASCULAR:  2+ pulses PT/DT with brisk capillary refill toes.    Other Findings:  Pain with single leg heel raise      XRAYS:  Right Foot 3 views (AP, lateral,mortise)  were ordered and reviewed.   No evidence of any fracture or dislocation.  The osseous structures appear well mineralized and well aligned. No mortise displacement.    ASSESSMENT:   Right foot sprain    PLAN:  We will order an MRI to assess her midfoot  We will call with results   I have discussed the nature of this problem with the patient today. We discussed both surgical and non-surgical options.

## 2018-03-26 ENCOUNTER — HOSPITAL ENCOUNTER (OUTPATIENT)
Dept: RADIOLOGY | Facility: HOSPITAL | Age: 23
Discharge: HOME | End: 2018-03-26
Attending: ORTHOPAEDIC SURGERY
Payer: COMMERCIAL

## 2018-03-26 DIAGNOSIS — M79.671 RIGHT FOOT PAIN: ICD-10-CM

## 2018-03-26 DIAGNOSIS — S76.311A RIGHT HAMSTRING MUSCLE STRAIN, INITIAL ENCOUNTER: ICD-10-CM

## 2018-03-26 PROCEDURE — 73718 MRI LOWER EXTREMITY W/O DYE: CPT | Mod: TC,RT

## 2018-03-26 PROCEDURE — 73718 MRI LOWER EXTREMITY W/O DYE: CPT | Mod: 26,RT,, | Performed by: RADIOLOGY

## 2018-04-06 ENCOUNTER — TELEPHONE (OUTPATIENT)
Dept: SPORTS MEDICINE | Facility: CLINIC | Age: 23
End: 2018-04-06

## 2018-04-06 NOTE — TELEPHONE ENCOUNTER
Called patient to discuss results of MRI femur:  Impression       Unremarkable examination without evidence of myotendinous injury or other acute abnormality.     And foot:  Impression         Unremarkable MRI evaluation of the right forefoot.       Plan for rehab/dry needling for right hamstring and activities as tolerated for right foot.
